# Patient Record
Sex: MALE | Race: BLACK OR AFRICAN AMERICAN | NOT HISPANIC OR LATINO | URBAN - METROPOLITAN AREA
[De-identification: names, ages, dates, MRNs, and addresses within clinical notes are randomized per-mention and may not be internally consistent; named-entity substitution may affect disease eponyms.]

---

## 2017-09-14 ENCOUNTER — LAB VISIT (OUTPATIENT)
Dept: LAB | Facility: HOSPITAL | Age: 32
End: 2017-09-14
Attending: INTERNAL MEDICINE
Payer: COMMERCIAL

## 2017-09-14 ENCOUNTER — TELEPHONE (OUTPATIENT)
Dept: ENDOCRINOLOGY | Facility: CLINIC | Age: 32
End: 2017-09-14

## 2017-09-14 ENCOUNTER — OFFICE VISIT (OUTPATIENT)
Dept: ENDOCRINOLOGY | Facility: CLINIC | Age: 32
End: 2017-09-14
Payer: COMMERCIAL

## 2017-09-14 VITALS
WEIGHT: 185.63 LBS | SYSTOLIC BLOOD PRESSURE: 136 MMHG | HEIGHT: 69 IN | TEMPERATURE: 98 F | HEART RATE: 82 BPM | DIASTOLIC BLOOD PRESSURE: 88 MMHG | BODY MASS INDEX: 27.49 KG/M2

## 2017-09-14 DIAGNOSIS — E05.90 HYPERTHYROIDISM: Primary | ICD-10-CM

## 2017-09-14 DIAGNOSIS — E05.90 HYPERTHYROIDISM: ICD-10-CM

## 2017-09-14 LAB
ALBUMIN SERPL BCP-MCNC: 4.1 G/DL
ALP SERPL-CCNC: 62 U/L
ALT SERPL W/O P-5'-P-CCNC: 30 U/L
ANION GAP SERPL CALC-SCNC: 9 MMOL/L
AST SERPL-CCNC: 34 U/L
BASOPHILS # BLD AUTO: 0.02 K/UL
BASOPHILS NFR BLD: 0.2 %
BILIRUB SERPL-MCNC: 1 MG/DL
BUN SERPL-MCNC: 8 MG/DL
CALCIUM SERPL-MCNC: 9.2 MG/DL
CHLORIDE SERPL-SCNC: 104 MMOL/L
CO2 SERPL-SCNC: 29 MMOL/L
CREAT SERPL-MCNC: 0.9 MG/DL
DIFFERENTIAL METHOD: ABNORMAL
EOSINOPHIL # BLD AUTO: 0.1 K/UL
EOSINOPHIL NFR BLD: 0.6 %
ERYTHROCYTE [DISTWIDTH] IN BLOOD BY AUTOMATED COUNT: 14.3 %
EST. GFR  (AFRICAN AMERICAN): >60 ML/MIN/1.73 M^2
EST. GFR  (NON AFRICAN AMERICAN): >60 ML/MIN/1.73 M^2
GLUCOSE SERPL-MCNC: 81 MG/DL
HCT VFR BLD AUTO: 40.3 %
HGB BLD-MCNC: 13.3 G/DL
LYMPHOCYTES # BLD AUTO: 2 K/UL
LYMPHOCYTES NFR BLD: 19.3 %
MCH RBC QN AUTO: 29.4 PG
MCHC RBC AUTO-ENTMCNC: 33 G/DL
MCV RBC AUTO: 89 FL
MONOCYTES # BLD AUTO: 1.1 K/UL
MONOCYTES NFR BLD: 10.3 %
NEUTROPHILS # BLD AUTO: 7.3 K/UL
NEUTROPHILS NFR BLD: 69.3 %
PLATELET # BLD AUTO: 284 K/UL
PMV BLD AUTO: 10.9 FL
POTASSIUM SERPL-SCNC: 3.8 MMOL/L
PROT SERPL-MCNC: 8.1 G/DL
RBC # BLD AUTO: 4.52 M/UL
SODIUM SERPL-SCNC: 142 MMOL/L
T3FREE SERPL-MCNC: 2.4 PG/ML
T4 FREE SERPL-MCNC: 0.93 NG/DL
TSH SERPL DL<=0.005 MIU/L-ACNC: 0.51 UIU/ML
WBC # BLD AUTO: 10.55 K/UL

## 2017-09-14 PROCEDURE — 99999 PR PBB SHADOW E&M-NEW PATIENT-LVL III: CPT | Mod: PBBFAC,,, | Performed by: INTERNAL MEDICINE

## 2017-09-14 PROCEDURE — 3008F BODY MASS INDEX DOCD: CPT | Mod: S$GLB,,, | Performed by: INTERNAL MEDICINE

## 2017-09-14 PROCEDURE — 84481 FREE ASSAY (FT-3): CPT

## 2017-09-14 PROCEDURE — 84443 ASSAY THYROID STIM HORMONE: CPT

## 2017-09-14 PROCEDURE — 86376 MICROSOMAL ANTIBODY EACH: CPT

## 2017-09-14 PROCEDURE — 84439 ASSAY OF FREE THYROXINE: CPT

## 2017-09-14 PROCEDURE — 84445 ASSAY OF TSI GLOBULIN: CPT

## 2017-09-14 PROCEDURE — 86800 THYROGLOBULIN ANTIBODY: CPT

## 2017-09-14 PROCEDURE — 85025 COMPLETE CBC W/AUTO DIFF WBC: CPT

## 2017-09-14 PROCEDURE — 80053 COMPREHEN METABOLIC PANEL: CPT

## 2017-09-14 PROCEDURE — 36415 COLL VENOUS BLD VENIPUNCTURE: CPT | Mod: PO

## 2017-09-14 PROCEDURE — 99204 OFFICE O/P NEW MOD 45 MIN: CPT | Mod: S$GLB,,, | Performed by: INTERNAL MEDICINE

## 2017-09-14 RX ORDER — AMLODIPINE BESYLATE 5 MG/1
5 TABLET ORAL DAILY
COMMUNITY
End: 2019-04-01

## 2017-09-14 RX ORDER — PNV NO.95/FERROUS FUM/FOLIC AC 28MG-0.8MG
100 TABLET ORAL DAILY
COMMUNITY

## 2017-09-14 RX ORDER — SERTRALINE HYDROCHLORIDE 25 MG/1
50 TABLET, FILM COATED ORAL DAILY
COMMUNITY
End: 2018-09-17 | Stop reason: SDUPTHER

## 2017-09-14 RX ORDER — HYDROCHLOROTHIAZIDE 12.5 MG/1
12.5 TABLET ORAL DAILY
COMMUNITY
End: 2019-04-01

## 2017-09-14 RX ORDER — METHIMAZOLE 10 MG/1
10 TABLET ORAL DAILY
COMMUNITY
End: 2018-09-20 | Stop reason: SDUPTHER

## 2017-09-14 NOTE — PROGRESS NOTES
""This note will be shared with the patient"Subjective:       Patient ID: Jermaine Boast is a 32 y.o. male.  Patient is new to me  Chief Complaint: Hyperthyroidism    HPI    Consultation was requested by Markerral Self       Diagnosed: 2014    Previous radiology tests:  Thyroid ultrasound : Yes - has several nodules- benign biopsy Dr Rigo Henriquez   NM uptake and scan: No    Previous thyroid surgery: No      Thyroid symptoms:fatigue, palpitations, sweating and weight loss      Thyroid medications: methimazole 10 mg once a day      Takes medication appropriately: Yes    I have reviewed the past medical, family and social history  Review of Systems   Constitutional: Positive for fatigue and unexpected weight change. Negative for appetite change, diaphoresis and fever.        Weight loss and fluctuates   HENT: Negative for sore throat and trouble swallowing.    Eyes: Negative for visual disturbance.   Respiratory: Negative for shortness of breath and wheezing.    Cardiovascular: Positive for palpitations. Negative for chest pain and leg swelling.   Gastrointestinal: Positive for diarrhea and nausea. Negative for abdominal pain and vomiting.   Endocrine: Negative for cold intolerance, heat intolerance, polydipsia, polyphagia and polyuria.   Genitourinary: Negative for difficulty urinating and dysuria.   Musculoskeletal: Negative for arthralgias and joint swelling.   Skin: Negative for color change and rash.   Neurological: Negative for dizziness, tremors, numbness and headaches.   Hematological: Negative for adenopathy.   Psychiatric/Behavioral: Positive for sleep disturbance. Negative for confusion and dysphoric mood. The patient is not nervous/anxious.        Objective:      Physical Exam   Constitutional: He is oriented to person, place, and time. He appears well-developed and well-nourished. No distress.   HENT:   Head: Normocephalic and atraumatic.   Right Ear: External ear normal.   Left Ear: External ear normal. "   Mouth/Throat: Oropharynx is clear and moist. No oropharyngeal exudate.   Eyes: Conjunctivae and EOM are normal. Pupils are equal, round, and reactive to light. No scleral icterus.   Neck: No tracheal deviation present. Thyromegaly present.   Cardiovascular: Normal rate, regular rhythm, normal heart sounds and intact distal pulses.  Exam reveals no gallop and no friction rub.    No murmur heard.  Pulmonary/Chest: Effort normal and breath sounds normal. No respiratory distress. He has no wheezes. He has no rales.   Abdominal: Soft. Bowel sounds are normal. He exhibits no distension and no mass. There is no tenderness. There is no rebound and no guarding. No hernia.   Musculoskeletal: He exhibits no edema or deformity.   Lymphadenopathy:     He has no cervical adenopathy.   Neurological: He is alert and oriented to person, place, and time. He has normal reflexes. No cranial nerve deficit.   Skin: Skin is warm and dry. No rash noted. He is not diaphoretic. No erythema.   Psychiatric: He has a normal mood and affect. His behavior is normal.   Vitals reviewed.        Lab Review:   No results found for: TSH  No results found for: FREET4  No components found for: FREET3      Assessment:     1. Hyperthyroidism  TSH    T4, free    T3, free    Thyroid peroxidase antibody    Thyroid stimulating immunoglobulin    Anti-thyroglobulin antibody    Comprehensive metabolic panel    CBC auto differential    check labs and adjust dos of methimazole as needed    Obtain records from previous endocrinologist  Plan:   Hyperthyroidism  -     TSH; Future; Expected date: 09/14/2017  -     T4, free; Future; Expected date: 09/14/2017  -     T3, free; Future; Expected date: 09/14/2017  -     Thyroid peroxidase antibody; Future; Expected date: 09/14/2017  -     Thyroid stimulating immunoglobulin; Future; Expected date: 09/14/2017  -     Anti-thyroglobulin antibody; Future; Expected date: 09/14/2017  -     Comprehensive metabolic panel; Future;  Expected date: 09/14/2017  -     CBC auto differential; Future; Expected date: 09/14/2017        Return in about 3 months (around 12/14/2017).     Thank you to Aaareferral Self for this consultation

## 2017-09-14 NOTE — TELEPHONE ENCOUNTER
----- Message from Damaris Raygzoa MD sent at 9/14/2017  1:08 PM CDT -----  Please obtain all records from his previous endocrinologist Dr Rigo Mukherjee

## 2017-09-15 LAB
THYROGLOB AB SERPL IA-ACNC: <4 IU/ML
THYROPEROXIDASE IGG SERPL-ACNC: <6 IU/ML

## 2017-09-18 LAB — TSI SER-ACNC: <0.1 IU/L

## 2017-09-20 ENCOUNTER — TELEPHONE (OUTPATIENT)
Dept: ENDOCRINOLOGY | Facility: CLINIC | Age: 32
End: 2017-09-20

## 2017-09-20 NOTE — TELEPHONE ENCOUNTER
----- Message from Verona Christie sent at 9/20/2017 12:06 PM CDT -----  Contact: pt  States she's calling to see if her paper work has been filled out and faxed. Please call pt after 3:30 at [phone]. Thank you

## 2017-09-20 NOTE — TELEPHONE ENCOUNTER
Pt stated that forms were given to MD during clinic visit and was checking to see if forms were ready for pick-up. Please advise.

## 2017-09-21 ENCOUNTER — TELEPHONE (OUTPATIENT)
Dept: ENDOCRINOLOGY | Facility: CLINIC | Age: 32
End: 2017-09-21

## 2017-09-21 NOTE — TELEPHONE ENCOUNTER
Pt advised the following, per MD orders:forms are ready for pickup and make a copy to scan in records and can inform patient has labs were normal so stay on same dose of methimazole.  Pt verbalized understanding.

## 2017-09-26 ENCOUNTER — TELEPHONE (OUTPATIENT)
Dept: ENDOCRINOLOGY | Facility: CLINIC | Age: 32
End: 2017-09-26

## 2017-09-26 NOTE — TELEPHONE ENCOUNTER
----- Message from Shikha Masters sent at 9/26/2017  9:22 AM CDT -----  Contact: Diane/[place] wife   Call caller regarding questions about side affect of med.  ..[phone]

## 2017-09-26 NOTE — TELEPHONE ENCOUNTER
"Pt's wife was requesting a CT scan, due to patient "getting strange body movements". Pt's wife was advised to notify the PCP. Pt's wife verbalized orders.  "

## 2017-09-27 ENCOUNTER — TELEPHONE (OUTPATIENT)
Dept: ENDOCRINOLOGY | Facility: CLINIC | Age: 32
End: 2017-09-27

## 2017-09-27 NOTE — TELEPHONE ENCOUNTER
----- Message from Maranda Owen sent at 9/27/2017 11:38 AM CDT -----  Contact: Diane (pt's wife)   Diane called and stated she needed to speak to the nurse. She stated that she needs to discuss the [facility] papers for the pt. She stated that the patient needs an updated copy of the papers faxed to his employer at [phone]. She can be reached at [phone].    Thanks,  TF

## 2017-10-09 ENCOUNTER — TELEPHONE (OUTPATIENT)
Dept: ENDOCRINOLOGY | Facility: CLINIC | Age: 32
End: 2017-10-09

## 2017-10-09 NOTE — TELEPHONE ENCOUNTER
----- Message from Luci Melgar sent at 10/6/2017 10:46 AM CDT -----  Contact: Patient  Patient is checking on status of the medical form for his job, please call him back at [phone]. Thank you

## 2017-10-11 ENCOUNTER — TELEPHONE (OUTPATIENT)
Dept: ENDOCRINOLOGY | Facility: CLINIC | Age: 32
End: 2017-10-11

## 2017-10-12 ENCOUNTER — TELEPHONE (OUTPATIENT)
Dept: ENDOCRINOLOGY | Facility: CLINIC | Age: 32
End: 2017-10-12

## 2017-10-12 NOTE — TELEPHONE ENCOUNTER
Per pt's wife, fax work forms to Attn: Consuelo [phone], upon completion.    **Please attach this information to forms.

## 2017-10-12 NOTE — TELEPHONE ENCOUNTER
----- Message from Chary Collins sent at 10/12/2017  8:36 AM CDT -----  Contact: [phone]  Would like to consult with nurse about paperwork to be sent to pt job. Form stating pt has thyroid issues.  Please call wife with any questions at [phone]. Fax to sandro Cordero [phone]. thx lj

## 2017-10-16 ENCOUNTER — TELEPHONE (OUTPATIENT)
Dept: ENDOCRINOLOGY | Facility: CLINIC | Age: 32
End: 2017-10-16

## 2017-10-16 NOTE — TELEPHONE ENCOUNTER
----- Message from Chary Collins sent at 10/16/2017 10:55 AM CDT -----  Contact: [phone]  Would like to consult with nurse about paperwork that was filled out for pt. has questions; concerns;  Please call bk at [phone]. thx lj

## 2017-10-20 ENCOUNTER — HISTORICAL (OUTPATIENT)
Dept: ADMINISTRATIVE | Facility: HOSPITAL | Age: 32
End: 2017-10-20

## 2017-10-22 LAB — FINAL CULTURE: NORMAL

## 2017-10-25 ENCOUNTER — TELEPHONE (OUTPATIENT)
Dept: ENDOCRINOLOGY | Facility: CLINIC | Age: 32
End: 2017-10-25

## 2017-10-25 NOTE — TELEPHONE ENCOUNTER
----- Message from Chula Bowman sent at 10/25/2017  9:29 AM CDT -----  Contact: Vlad Contreras [phone]  Would like to consult with nurse regarding paperwork. Please call back [phone].  Md Alejandra

## 2017-10-25 NOTE — TELEPHONE ENCOUNTER
----- Message from Kamla Ann sent at 10/25/2017 12:25 PM CDT -----  Contact: Pt's wife,   Pt's wife stated that she would like to speak to the nurse regarding her 's forms. She also has a general question as well. She can be reached at [phone].

## 2017-11-02 ENCOUNTER — TELEPHONE (OUTPATIENT)
Dept: ENDOCRINOLOGY | Facility: CLINIC | Age: 32
End: 2017-11-02

## 2017-11-02 NOTE — TELEPHONE ENCOUNTER
----- Message from Cindy Elizabeth sent at 10/31/2017  9:19 AM CDT -----  Contact: [place]/ Wife   Caller states the paperwork will be faxed over, request a call back before you fill the form out. .[phone] (home)

## 2018-03-26 ENCOUNTER — LAB VISIT (OUTPATIENT)
Dept: LAB | Facility: HOSPITAL | Age: 33
End: 2018-03-26
Attending: INTERNAL MEDICINE
Payer: COMMERCIAL

## 2018-03-26 ENCOUNTER — OFFICE VISIT (OUTPATIENT)
Dept: ENDOCRINOLOGY | Facility: CLINIC | Age: 33
End: 2018-03-26
Payer: COMMERCIAL

## 2018-03-26 VITALS
TEMPERATURE: 99 F | HEIGHT: 69 IN | HEART RATE: 83 BPM | DIASTOLIC BLOOD PRESSURE: 88 MMHG | BODY MASS INDEX: 31.21 KG/M2 | WEIGHT: 210.75 LBS | SYSTOLIC BLOOD PRESSURE: 132 MMHG

## 2018-03-26 DIAGNOSIS — E05.90 HYPERTHYROIDISM: ICD-10-CM

## 2018-03-26 DIAGNOSIS — E05.90 HYPERTHYROIDISM: Primary | ICD-10-CM

## 2018-03-26 PROCEDURE — 85025 COMPLETE CBC W/AUTO DIFF WBC: CPT

## 2018-03-26 PROCEDURE — 99999 PR PBB SHADOW E&M-EST. PATIENT-LVL III: CPT | Mod: PBBFAC,,, | Performed by: INTERNAL MEDICINE

## 2018-03-26 PROCEDURE — 84481 FREE ASSAY (FT-3): CPT

## 2018-03-26 PROCEDURE — 84439 ASSAY OF FREE THYROXINE: CPT

## 2018-03-26 PROCEDURE — 84443 ASSAY THYROID STIM HORMONE: CPT

## 2018-03-26 PROCEDURE — 36415 COLL VENOUS BLD VENIPUNCTURE: CPT | Mod: PO

## 2018-03-26 PROCEDURE — 99213 OFFICE O/P EST LOW 20 MIN: CPT | Mod: S$GLB,,, | Performed by: INTERNAL MEDICINE

## 2018-03-26 PROCEDURE — 80076 HEPATIC FUNCTION PANEL: CPT

## 2018-03-26 NOTE — PROGRESS NOTES
Patient ID: Jermaine Boast is a 33 y.o. male.  Patient is here for follow up      Last time seen in September and he is here with his wife  Chief Complaint: Hyperthyroidism      HPI    Consultation was requested by Maurice Self       Diagnosed: 2014    Previous radiology tests:  Thyroid ultrasound : Yes - has several nodules- benign biopsy Dr Rigo Henriquez   NM uptake and scan: No    Previous thyroid surgery: No      Thyroid symptoms:fatigue, palpitations,  Feel cold and hot, trouble concentrating and weight gain and wife said he's been very sleepy and hard to wake up    Thyroid medications: methimazole 10 mg once a day      Takes medication appropriately: Yes      I have reviewed the past medical, family and social history    Review of Systems   Constitutional: Positive for fatigue and unexpected weight change. Negative for appetite change, diaphoresis and fever.   HENT: Negative for sore throat and trouble swallowing.    Eyes: Negative for visual disturbance.   Respiratory: Negative for shortness of breath and wheezing.    Cardiovascular: Positive for palpitations. Negative for chest pain and leg swelling.   Gastrointestinal: Positive for constipation. Negative for abdominal pain, diarrhea, nausea and vomiting.   Endocrine: Negative for cold intolerance, heat intolerance, polydipsia, polyphagia and polyuria.   Genitourinary: Negative for difficulty urinating and dysuria.   Musculoskeletal: Negative for arthralgias and joint swelling.   Skin: Negative for color change and rash.   Neurological: Negative for dizziness, tremors, numbness and headaches.   Hematological: Negative for adenopathy.   Psychiatric/Behavioral: Negative for confusion, dysphoric mood and sleep disturbance. The patient is not nervous/anxious.        Objective:      Physical Exam   Constitutional: He appears well-developed and well-nourished. No distress.   HENT:   Head: Normocephalic and atraumatic.   Eyes: Conjunctivae are normal.   Neck:  No JVD present. No tracheal deviation present. No thyromegaly present.   Cardiovascular: Normal rate, regular rhythm, normal heart sounds and intact distal pulses.  Exam reveals no gallop and no friction rub.    No murmur heard.  Pulmonary/Chest: Effort normal and breath sounds normal. No stridor. No respiratory distress. He has no wheezes. He has no rales. He exhibits no tenderness.   Musculoskeletal: He exhibits no edema or deformity.   Lymphadenopathy:     He has no cervical adenopathy.   Neurological: He is alert.   Skin: He is not diaphoretic.   Vitals reviewed.        Lab Review:   No visits with results within 6 Month(s) from this visit.   Latest known visit with results is:   Lab Visit on 09/14/2017   Component Date Value    TSH 09/14/2017 0.507     Free T4 09/14/2017 0.93     T3, Free 09/14/2017 2.4     Thyroperoxidase Antibodi* 09/14/2017 <6.0     Thyroid-Stim IG [facility]* 09/14/2017 <0.10     Thyroglobulin Ab Screen 09/14/2017 <4.0     Sodium 09/14/2017 142     Potassium 09/14/2017 3.8     Chloride 09/14/2017 104     CO2 09/14/2017 29     Glucose 09/14/2017 81     BUN, Bld 09/14/2017 8     Creatinine 09/14/2017 0.9     Calcium 09/14/2017 9.2     Total Protein 09/14/2017 8.1     Albumin 09/14/2017 4.1     Total Bilirubin 09/14/2017 1.0     Alkaline Phosphatase 09/14/2017 62     AST 09/14/2017 34     ALT 09/14/2017 30     Anion Gap 09/14/2017 9     eGFR if African American 09/14/2017 >60.0     eGFR if non African Amer* 09/14/2017 >60.0     WBC 09/14/2017 10.55     RBC 09/14/2017 4.52*    Hemoglobin 09/14/2017 13.3*    Hematocrit 09/14/2017 40.3     MCV 09/14/2017 89     MCH 09/14/2017 29.4     MCHC 09/14/2017 33.0     RDW 09/14/2017 14.3     Platelets 09/14/2017 284     MPV 09/14/2017 10.9     Gran # (ANC) 09/14/2017 7.3     Lymph # 09/14/2017 2.0     Mono # 09/14/2017 1.1*    Eos # 09/14/2017 0.1     Baso # 09/14/2017 0.02     Gran% 09/14/2017 69.3     Lymph%  09/14/2017 19.3     Mono% 09/14/2017 10.3     Eosinophil% 09/14/2017 0.6     Basophil% 09/14/2017 0.2     Differential Method 09/14/2017 Automated        Assessment:     1. Hyperthyroidism  TSH    T4, free    T3, free    CBC auto differential    Hepatic function panel    he may have medication-induced hypothyroidism but will check labs below for further evaluation  Plan:   Hyperthyroidism  -     TSH; Future; Expected date: 03/26/2018  -     T4, free; Future; Expected date: 03/26/2018  -     T3, free; Future; Expected date: 03/26/2018  -     CBC auto differential; Future; Expected date: 03/26/2018  -     Hepatic function panel; Future; Expected date: 03/26/2018          Follow-up in about 3 months (around 6/26/2018).    Labs prior to appointment? not applicable     Disclaimer:  This note may have been partially prepared using voice recognition software and  it may have not been extensively proofed, as such there could be errors within the text such as sound alike errors.

## 2018-03-27 LAB
ALBUMIN SERPL BCP-MCNC: 4.5 G/DL
ALP SERPL-CCNC: 65 U/L
ALT SERPL W/O P-5'-P-CCNC: 27 U/L
AST SERPL-CCNC: 32 U/L
BASOPHILS # BLD AUTO: 0.02 K/UL
BASOPHILS NFR BLD: 0.2 %
BILIRUB DIRECT SERPL-MCNC: 0.3 MG/DL
BILIRUB SERPL-MCNC: 0.8 MG/DL
DIFFERENTIAL METHOD: ABNORMAL
EOSINOPHIL # BLD AUTO: 0.1 K/UL
EOSINOPHIL NFR BLD: 0.9 %
ERYTHROCYTE [DISTWIDTH] IN BLOOD BY AUTOMATED COUNT: 12.6 %
HCT VFR BLD AUTO: 42 %
HGB BLD-MCNC: 13.1 G/DL
IMM GRANULOCYTES # BLD AUTO: 0.04 K/UL
IMM GRANULOCYTES NFR BLD AUTO: 0.4 %
LYMPHOCYTES # BLD AUTO: 2.5 K/UL
LYMPHOCYTES NFR BLD: 27.4 %
MCH RBC QN AUTO: 29.3 PG
MCHC RBC AUTO-ENTMCNC: 31.2 G/DL
MCV RBC AUTO: 94 FL
MONOCYTES # BLD AUTO: 0.9 K/UL
MONOCYTES NFR BLD: 10.3 %
NEUTROPHILS # BLD AUTO: 5.5 K/UL
NEUTROPHILS NFR BLD: 60.8 %
NRBC BLD-RTO: 0 /100 WBC
PLATELET # BLD AUTO: 272 K/UL
PMV BLD AUTO: 11.6 FL
PROT SERPL-MCNC: 8.2 G/DL
RBC # BLD AUTO: 4.47 M/UL
T3FREE SERPL-MCNC: 2.5 PG/ML
T4 FREE SERPL-MCNC: 1.11 NG/DL
TSH SERPL DL<=0.005 MIU/L-ACNC: 0.08 UIU/ML
WBC # BLD AUTO: 9.06 K/UL

## 2018-04-05 ENCOUNTER — TELEPHONE (OUTPATIENT)
Dept: ENDOCRINOLOGY | Facility: CLINIC | Age: 33
End: 2018-04-05

## 2018-04-05 NOTE — TELEPHONE ENCOUNTER
Voicemail has not been set-up. The following information was mailed to pt: labs are only mildly abnormal.  I wanted to stay on the 10 mg dose for now and try not to miss any doses.  Take 1 tablet daily

## 2018-07-03 ENCOUNTER — TELEPHONE (OUTPATIENT)
Dept: ENDOCRINOLOGY | Facility: CLINIC | Age: 33
End: 2018-07-03

## 2018-07-03 NOTE — TELEPHONE ENCOUNTER
----- Message from Sear Sanchez sent at 7/3/2018  2:13 PM CDT -----  Contact: pt  Pt calling for a referral to see another doctor, she can be reached at  [phone] Thanks

## 2018-09-17 ENCOUNTER — LAB VISIT (OUTPATIENT)
Dept: LAB | Facility: HOSPITAL | Age: 33
End: 2018-09-17
Attending: INTERNAL MEDICINE
Payer: COMMERCIAL

## 2018-09-17 ENCOUNTER — OFFICE VISIT (OUTPATIENT)
Dept: ENDOCRINOLOGY | Facility: CLINIC | Age: 33
End: 2018-09-17
Payer: COMMERCIAL

## 2018-09-17 VITALS
SYSTOLIC BLOOD PRESSURE: 126 MMHG | HEART RATE: 80 BPM | TEMPERATURE: 99 F | WEIGHT: 217.38 LBS | DIASTOLIC BLOOD PRESSURE: 88 MMHG | BODY MASS INDEX: 32.2 KG/M2 | HEIGHT: 69 IN

## 2018-09-17 DIAGNOSIS — R53.81 MALAISE AND FATIGUE: ICD-10-CM

## 2018-09-17 DIAGNOSIS — R53.83 MALAISE AND FATIGUE: ICD-10-CM

## 2018-09-17 DIAGNOSIS — R68.89 HEAT INTOLERANCE: ICD-10-CM

## 2018-09-17 DIAGNOSIS — E04.2 MULTIPLE THYROID NODULES: ICD-10-CM

## 2018-09-17 DIAGNOSIS — E05.90 HYPERTHYROIDISM: Primary | ICD-10-CM

## 2018-09-17 DIAGNOSIS — R11.2 NAUSEA AND VOMITING, INTRACTABILITY OF VOMITING NOT SPECIFIED, UNSPECIFIED VOMITING TYPE: ICD-10-CM

## 2018-09-17 DIAGNOSIS — E05.90 HYPERTHYROIDISM: ICD-10-CM

## 2018-09-17 PROCEDURE — 84439 ASSAY OF FREE THYROXINE: CPT

## 2018-09-17 PROCEDURE — 84443 ASSAY THYROID STIM HORMONE: CPT

## 2018-09-17 PROCEDURE — 84481 FREE ASSAY (FT-3): CPT

## 2018-09-17 PROCEDURE — 84445 ASSAY OF TSI GLOBULIN: CPT

## 2018-09-17 PROCEDURE — 85025 COMPLETE CBC W/AUTO DIFF WBC: CPT

## 2018-09-17 PROCEDURE — 80053 COMPREHEN METABOLIC PANEL: CPT

## 2018-09-17 PROCEDURE — 99999 PR PBB SHADOW E&M-EST. PATIENT-LVL III: CPT | Mod: PBBFAC,,, | Performed by: INTERNAL MEDICINE

## 2018-09-17 PROCEDURE — 99214 OFFICE O/P EST MOD 30 MIN: CPT | Mod: S$GLB,,, | Performed by: INTERNAL MEDICINE

## 2018-09-17 PROCEDURE — 3008F BODY MASS INDEX DOCD: CPT | Mod: CPTII,S$GLB,, | Performed by: INTERNAL MEDICINE

## 2018-09-17 RX ORDER — SERTRALINE HYDROCHLORIDE 50 MG/1
50 TABLET, FILM COATED ORAL DAILY
COMMUNITY

## 2018-09-17 NOTE — PROGRESS NOTES
Patient ID: Jermaine Boast is a 33 y.o. male.  Patient is here for follow up        Chief Complaint: Hyperthyroidism      HPI    Consultation was requested by Aaareferral Self       Diagnosed: 2014    Previous radiology tests:  Thyroid ultrasound : Yes - has several nodules- benign biopsy Dr Rigo Henriquez   NM uptake and scan: No    Previous thyroid surgery: No      Thyroid symptoms:  Patient reports multiple varied complaints-see nurse's note but complains of cough and congestion for 3 months, visual complaints requiring glasses- vision corrected with glasses, trouble swallowing, hot flashes, feeling overheated- house has to be hot, sweats at night, + nausea and vomiting in morning and he denies any abdominal pain    New PCP is Dr Fallon and has an appointment in a few weeks  His weight is higher than last visit    Thyroid medications: methimazole 10 mg once a day however he has been out of medication for at least 2 weeks and his wife had an old bottle from his previous endocrinologist    Takes medication appropriately: Yes        I have reviewed the past medical, family and social history    Review of Systems   Constitutional: Positive for appetite change and fatigue. Negative for diaphoresis, fever and unexpected weight change.   HENT: Positive for trouble swallowing. Negative for sore throat.    Eyes: Negative for visual disturbance.   Respiratory: Positive for cough. Negative for shortness of breath and wheezing.    Cardiovascular: Negative for chest pain, palpitations and leg swelling.   Gastrointestinal: Positive for nausea and vomiting. Negative for abdominal pain and diarrhea.   Endocrine: Negative for cold intolerance, heat intolerance, polydipsia, polyphagia and polyuria.   Genitourinary: Negative for difficulty urinating and dysuria.   Musculoskeletal: Negative for arthralgias and joint swelling.   Skin: Negative for color change and rash.   Neurological: Negative for dizziness, tremors, numbness and  headaches.   Hematological: Negative for adenopathy.   Psychiatric/Behavioral: Negative for confusion, dysphoric mood and sleep disturbance. The patient is not nervous/anxious.        Objective:      Physical Exam   Constitutional: He is oriented to person, place, and time. He appears well-developed and well-nourished. No distress.   HENT:   Head: Normocephalic and atraumatic.   Right Ear: External ear normal.   Left Ear: External ear normal.   Mouth/Throat: Oropharynx is clear and moist. No oropharyngeal exudate.   Eyes: Conjunctivae and EOM are normal. Pupils are equal, round, and reactive to light. No scleral icterus.   Neck: No tracheal deviation present. No thyromegaly present.   Cardiovascular: Normal rate, regular rhythm, normal heart sounds and intact distal pulses. Exam reveals no gallop and no friction rub.   No murmur heard.  Pulmonary/Chest: Effort normal and breath sounds normal. No respiratory distress. He has no wheezes. He has no rales.   Abdominal: Soft. Bowel sounds are normal. He exhibits no distension and no mass. There is no tenderness. There is no rebound and no guarding. No hernia.   Musculoskeletal: He exhibits no edema or deformity.   Lymphadenopathy:     He has no cervical adenopathy.   Neurological: He is alert and oriented to person, place, and time. He has normal reflexes. No cranial nerve deficit.   Skin: Skin is warm and dry. No rash noted. He is not diaphoretic. No erythema.   Psychiatric: He has a normal mood and affect. His behavior is normal.   Vitals reviewed.        Lab Review:   Lab Visit on 03/26/2018   Component Date Value    TSH 03/26/2018 0.081*    Free T4 03/26/2018 1.11     T3, Free 03/26/2018 2.5     WBC 03/26/2018 9.06     RBC 03/26/2018 4.47*    Hemoglobin 03/26/2018 13.1*    Hematocrit 03/26/2018 42.0     MCV 03/26/2018 94     MCH 03/26/2018 29.3     MCHC 03/26/2018 31.2*    RDW 03/26/2018 12.6     Platelets 03/26/2018 272     MPV 03/26/2018 11.6      Immature Granulocytes 03/26/2018 0.4     Gran # (ANC) 03/26/2018 5.5     Immature Grans (Abs) 03/26/2018 0.04     Lymph # 03/26/2018 2.5     Mono # 03/26/2018 0.9     Eos # 03/26/2018 0.1     Baso # 03/26/2018 0.02     nRBC 03/26/2018 0     Gran% 03/26/2018 60.8     Lymph% 03/26/2018 27.4     Mono% 03/26/2018 10.3     Eosinophil% 03/26/2018 0.9     Basophil% 03/26/2018 0.2     Differential Method 03/26/2018 Automated     Total Protein 03/26/2018 8.2     Albumin 03/26/2018 4.5     Total Bilirubin 03/26/2018 0.8     Bilirubin, Direct 03/26/2018 0.3     AST 03/26/2018 32     ALT 03/26/2018 27     Alkaline Phosphatase 03/26/2018 65        Assessment:     1. Hyperthyroidism  TSH    T3, free    T4, free    CBC auto differential    Comprehensive metabolic panel    Thyroid stimulating immunoglobulin   2. Malaise and fatigue  TSH    T3, free    T4, free    CBC auto differential    Comprehensive metabolic panel    Thyroid stimulating immunoglobulin   3. Multiple thyroid nodules  TSH    T3, free    T4, free    CBC auto differential    Comprehensive metabolic panel    US Soft Tissue Head Neck Thyroid    Thyroid stimulating immunoglobulin   4. Nausea and vomiting, intractability of vomiting not specified, unspecified vomiting type     5. Heat intolerance      I will check labs below to evaluate some of his symptoms.  Recheck his thyroid function tests and if still hyperthyroid I will refill his methimazole and also schedule thyroid ultrasound.  Encourage patient to keep his follow-up PCP for ongoing follow-up of his multiple symptoms including his persistent cough and chest congestion    We discussed clear liquids and avoiding spicy food and greasy food and fast food while he is having nausea and vomiting  Plan:   Hyperthyroidism  -     TSH; Future; Expected date: 09/17/2018  -     T3, free; Future; Expected date: 09/17/2018  -     T4, free; Future; Expected date: 09/17/2018  -     CBC auto differential;  Future; Expected date: 09/17/2018  -     Comprehensive metabolic panel; Future; Expected date: 09/17/2018  -     Thyroid stimulating immunoglobulin; Future; Expected date: 09/17/2018    Malaise and fatigue  -     TSH; Future; Expected date: 09/17/2018  -     T3, free; Future; Expected date: 09/17/2018  -     T4, free; Future; Expected date: 09/17/2018  -     CBC auto differential; Future; Expected date: 09/17/2018  -     Comprehensive metabolic panel; Future; Expected date: 09/17/2018  -     Thyroid stimulating immunoglobulin; Future; Expected date: 09/17/2018    Multiple thyroid nodules  -     TSH; Future; Expected date: 09/17/2018  -     T3, free; Future; Expected date: 09/17/2018  -     T4, free; Future; Expected date: 09/17/2018  -     CBC auto differential; Future; Expected date: 09/17/2018  -     Comprehensive metabolic panel; Future; Expected date: 09/17/2018  -     US Soft Tissue Head Neck Thyroid; Future; Expected date: 09/17/2018  -     Thyroid stimulating immunoglobulin; Future; Expected date: 09/17/2018    Nausea and vomiting, intractability of vomiting not specified, unspecified vomiting type    Heat intolerance      Follow-up in 4 months    No Follow-up on file.    Labs prior to appointment? not applicable     Disclaimer:  This note may have been partially prepared using voice recognition software and  it may have not been extensively proofed, as such there could be errors within the text such as sound alike errors.

## 2018-09-17 NOTE — PROGRESS NOTES
Hot flashes 3-4 times weekly, requiring the patient's to have cooling unit set to 60 degrees at home; finger tips tingling on bilateral hands; visual changes requiring glasses; loss of appetite; difficulty swallowing; mucus congestion in chest for 3 months yellow in color, thick, scant amount; increase shortness of breath sleep; decrease sleep (5 hours nightly); lower back pain 7 (scale of 1-10)

## 2018-09-18 LAB
ALBUMIN SERPL BCP-MCNC: 4.2 G/DL
ALP SERPL-CCNC: 80 U/L
ALT SERPL W/O P-5'-P-CCNC: 33 U/L
ANION GAP SERPL CALC-SCNC: 9 MMOL/L
AST SERPL-CCNC: 28 U/L
BASOPHILS # BLD AUTO: 0.04 K/UL
BASOPHILS NFR BLD: 0.4 %
BILIRUB SERPL-MCNC: 0.8 MG/DL
BUN SERPL-MCNC: 10 MG/DL
CALCIUM SERPL-MCNC: 9.6 MG/DL
CHLORIDE SERPL-SCNC: 105 MMOL/L
CO2 SERPL-SCNC: 26 MMOL/L
CREAT SERPL-MCNC: 0.9 MG/DL
DIFFERENTIAL METHOD: ABNORMAL
EOSINOPHIL # BLD AUTO: 0.1 K/UL
EOSINOPHIL NFR BLD: 1 %
ERYTHROCYTE [DISTWIDTH] IN BLOOD BY AUTOMATED COUNT: 13.3 %
EST. GFR  (AFRICAN AMERICAN): >60 ML/MIN/1.73 M^2
EST. GFR  (NON AFRICAN AMERICAN): >60 ML/MIN/1.73 M^2
GLUCOSE SERPL-MCNC: 72 MG/DL
HCT VFR BLD AUTO: 41.4 %
HGB BLD-MCNC: 12.4 G/DL
IMM GRANULOCYTES # BLD AUTO: 0.03 K/UL
IMM GRANULOCYTES NFR BLD AUTO: 0.3 %
LYMPHOCYTES # BLD AUTO: 2.2 K/UL
LYMPHOCYTES NFR BLD: 24.2 %
MCH RBC QN AUTO: 28.8 PG
MCHC RBC AUTO-ENTMCNC: 30 G/DL
MCV RBC AUTO: 96 FL
MONOCYTES # BLD AUTO: 1 K/UL
MONOCYTES NFR BLD: 10.7 %
NEUTROPHILS # BLD AUTO: 5.9 K/UL
NEUTROPHILS NFR BLD: 63.4 %
NRBC BLD-RTO: 0 /100 WBC
PLATELET # BLD AUTO: 276 K/UL
PMV BLD AUTO: 12 FL
POTASSIUM SERPL-SCNC: 4.1 MMOL/L
PROT SERPL-MCNC: 7.9 G/DL
RBC # BLD AUTO: 4.3 M/UL
SODIUM SERPL-SCNC: 140 MMOL/L
T3FREE SERPL-MCNC: 2.7 PG/ML
T4 FREE SERPL-MCNC: 1.1 NG/DL
TSH SERPL DL<=0.005 MIU/L-ACNC: 0.13 UIU/ML
WBC # BLD AUTO: 9.24 K/UL

## 2018-09-20 ENCOUNTER — TELEPHONE (OUTPATIENT)
Dept: ENDOCRINOLOGY | Facility: CLINIC | Age: 33
End: 2018-09-20

## 2018-09-20 LAB — TSI SER-ACNC: <0.1 IU/L

## 2018-09-20 RX ORDER — METHIMAZOLE 10 MG/1
10 TABLET ORAL DAILY
Qty: 30 TABLET | Refills: 3 | Status: SHIPPED | OUTPATIENT
Start: 2018-09-20 | End: 2019-06-15 | Stop reason: SDUPTHER

## 2018-09-20 RX ORDER — METHIMAZOLE 5 MG/1
10 TABLET ORAL DAILY
Qty: 45 TABLET | Refills: 6 | Status: CANCELLED | OUTPATIENT
Start: 2018-09-20

## 2018-09-20 NOTE — TELEPHONE ENCOUNTER
Pt advised the following, per MD orders:labs are stable except he just needs to get back on methimazole and I will send a prescription for 10 mg once a day. Pt verbalized understanding. Call ended pleasantly.

## 2018-10-01 ENCOUNTER — TELEPHONE (OUTPATIENT)
Dept: ENDOCRINOLOGY | Facility: CLINIC | Age: 33
End: 2018-10-01

## 2018-10-01 NOTE — TELEPHONE ENCOUNTER
----- Message from Urmila Perez sent at 10/1/2018  3:27 PM CDT -----  Contact: pt  Calling in [facility] to please give him a call back and RX has not been sent yet to [facility] in [place]  And please advise [phone] (home)

## 2018-10-01 NOTE — TELEPHONE ENCOUNTER
"Verified with pharmacist that Methimazole prescription had been received. Pt advised that pharmacist stated prescription was "in process". Pt verbalized understanding. Call ended pleasantly.  "

## 2019-02-15 ENCOUNTER — HOSPITAL ENCOUNTER (OUTPATIENT)
Dept: RADIOLOGY | Facility: HOSPITAL | Age: 34
Discharge: HOME | End: 2019-02-15
Attending: INTERNAL MEDICINE
Payer: COMMERCIAL

## 2019-02-15 DIAGNOSIS — E04.2 MULTIPLE THYROID NODULES: ICD-10-CM

## 2019-02-15 PROCEDURE — 76536 US EXAM OF HEAD AND NECK: CPT | Mod: TC

## 2019-02-15 PROCEDURE — 76536 US SOFT TISSUE HEAD NECK THYROID: ICD-10-PCS | Mod: 26,,, | Performed by: RADIOLOGY

## 2019-02-15 PROCEDURE — 76536 US EXAM OF HEAD AND NECK: CPT | Mod: 26,,, | Performed by: RADIOLOGY

## 2019-02-26 ENCOUNTER — TELEPHONE (OUTPATIENT)
Dept: ENDOCRINOLOGY | Facility: CLINIC | Age: 34
End: 2019-02-26

## 2019-02-26 NOTE — TELEPHONE ENCOUNTER
Called pt wife, Informed her of ultrasound results. Advised her to schedule a follow up appointment for pt to discuss condition further with MD. Wife voiced understanding and stated she will call back to schedule appointment.

## 2019-02-26 NOTE — TELEPHONE ENCOUNTER
----- Message from Damaris Raygoza MD sent at 2/26/2019 10:07 AM CST -----  Please let patient know his thyroid ultrasound shows thyroid nodules that I plan to follow over time.  He is due for follow-up appointment so please have him schedule the next available appointment

## 2019-02-26 NOTE — TELEPHONE ENCOUNTER
----- Message from Caro Quiroga sent at 2/26/2019 10:43 AM CST -----  Contact: wife   .Type:  Patient Returning Call    Who Called: wife  Who Left Message for Patient:pt  Does the patient know what this is regarding?: results  Would the patient rather a call back or a response via [employer]? Call back   Best Call Back Number: [phone]  Additional Information: \[facility] [place] [facility]\"" call wife back at  [phone]

## 2019-04-01 ENCOUNTER — OFFICE VISIT (OUTPATIENT)
Dept: ENDOCRINOLOGY | Facility: CLINIC | Age: 34
End: 2019-04-01
Payer: COMMERCIAL

## 2019-04-01 VITALS
WEIGHT: 205 LBS | DIASTOLIC BLOOD PRESSURE: 98 MMHG | HEART RATE: 93 BPM | SYSTOLIC BLOOD PRESSURE: 152 MMHG | BODY MASS INDEX: 30.36 KG/M2 | OXYGEN SATURATION: 98 % | HEIGHT: 69 IN

## 2019-04-01 DIAGNOSIS — E04.2 MULTIPLE THYROID NODULES: ICD-10-CM

## 2019-04-01 DIAGNOSIS — E05.90 HYPERTHYROIDISM: Primary | ICD-10-CM

## 2019-04-01 PROCEDURE — 99999 PR PBB SHADOW E&M-EST. PATIENT-LVL III: CPT | Mod: PBBFAC,,, | Performed by: INTERNAL MEDICINE

## 2019-04-01 PROCEDURE — 3008F PR BODY MASS INDEX (BMI) DOCUMENTED: ICD-10-PCS | Mod: CPTII,S$GLB,, | Performed by: INTERNAL MEDICINE

## 2019-04-01 PROCEDURE — 99999 PR PBB SHADOW E&M-EST. PATIENT-LVL III: ICD-10-PCS | Mod: PBBFAC,,, | Performed by: INTERNAL MEDICINE

## 2019-04-01 PROCEDURE — 99213 PR OFFICE/OUTPT VISIT, EST, LEVL III, 20-29 MIN: ICD-10-PCS | Mod: S$GLB,,, | Performed by: INTERNAL MEDICINE

## 2019-04-01 PROCEDURE — 99213 OFFICE O/P EST LOW 20 MIN: CPT | Mod: S$GLB,,, | Performed by: INTERNAL MEDICINE

## 2019-04-01 PROCEDURE — 3008F BODY MASS INDEX DOCD: CPT | Mod: CPTII,S$GLB,, | Performed by: INTERNAL MEDICINE

## 2019-04-01 RX ORDER — AMLODIPINE BESYLATE 10 MG/1
10 TABLET ORAL DAILY
Refills: 0 | COMMUNITY
Start: 2019-03-13

## 2019-04-01 RX ORDER — HYDROCHLOROTHIAZIDE 25 MG/1
25 TABLET ORAL DAILY
COMMUNITY

## 2019-04-01 NOTE — PROGRESS NOTES
Patient ID: Jermaine Boast is a 34 y.o. male.  Patient is here for follow up        Chief Complaint: Follow-up      HPI    Consultation was requested by Aaareferral Self       Diagnosed: 2014    Previous radiology tests:  Thyroid ultrasound : Yes - has several nodules- benign biopsy Dr Rigo Henriquez -recently had thyroid ultrasound here in February 2019 and showed bilateral thyroid nodules with largest measuring 1.8 cm, more prominent on the left side, many of the nodules seem isoechoic I directly viewed nodules, no calcifications are irregular borders or hypervascularity  NM uptake and scan: No    Previous thyroid surgery: No    TSI antibody had been checked multiple times and was negative  Thyroid symptoms:  None today and denies any trouble swallowing, denies palpitations or sweats  New PCP is Dr Fallon   His weight is higher than last visit    Thyroid medications: methimazole 10 mg     Patient brought in recent labs ordered by his PCP and his TSH was noted to be mildly suppressed at 0.27 with a normal free T4 and liver enzymes were normal and CBC overall was unremarkable    Takes medication appropriately: Yes        I have reviewed the past medical, family and social history    Review of Systems   Constitutional: Negative for appetite change, diaphoresis, fatigue, fever and unexpected weight change.   HENT: Negative for sore throat and trouble swallowing.    Eyes: Negative for visual disturbance.   Respiratory: Negative for shortness of breath and wheezing.    Cardiovascular: Negative for chest pain, palpitations and leg swelling.   Gastrointestinal: Negative for abdominal pain, diarrhea, nausea and vomiting.   Endocrine: Negative for cold intolerance, heat intolerance, polydipsia, polyphagia and polyuria.   Genitourinary: Negative for difficulty urinating and dysuria.   Musculoskeletal: Negative for arthralgias and joint swelling.   Skin: Negative for color change and rash.   Neurological: Negative for  dizziness, tremors, numbness and headaches.   Hematological: Negative for adenopathy.   Psychiatric/Behavioral: Negative for confusion, dysphoric mood and sleep disturbance. The patient is not nervous/anxious.        Objective:      Physical Exam   Constitutional: He appears well-developed and well-nourished. No distress.   HENT:   Head: Normocephalic and atraumatic.   Eyes: Conjunctivae are normal. No scleral icterus.   Neck: No JVD present. No tracheal deviation present. No thyromegaly present.   Cardiovascular: Normal rate, regular rhythm, normal heart sounds and intact distal pulses. Exam reveals no gallop and no friction rub.   No murmur heard.  Pulmonary/Chest: Effort normal and breath sounds normal. No stridor. No respiratory distress. He has no wheezes. He has no rales. He exhibits no tenderness.   Musculoskeletal: He exhibits no edema or deformity.   Lymphadenopathy:     He has no cervical adenopathy.   Neurological: He is alert.   Skin: He is not diaphoretic.   Vitals reviewed.        Lab Review:   No visits with results within 6 Month(s) from this visit.   Latest known visit with results is:   Lab Visit on 09/17/2018   Component Date Value    TSH 09/17/2018 0.129*    T3, Free 09/17/2018 2.7     Free T4 09/17/2018 1.10     WBC 09/17/2018 9.24     RBC 09/17/2018 4.30*    Hemoglobin 09/17/2018 12.4*    Hematocrit 09/17/2018 41.4     MCV 09/17/2018 96     MCH 09/17/2018 28.8     MCHC 09/17/2018 30.0*    RDW 09/17/2018 13.3     Platelets 09/17/2018 276     MPV 09/17/2018 12.0     Immature Granulocytes 09/17/2018 0.3     Gran # (ANC) 09/17/2018 5.9     Immature Grans (Abs) 09/17/2018 0.03     Lymph # 09/17/2018 2.2     Mono # 09/17/2018 1.0     Eos # 09/17/2018 0.1     Baso # 09/17/2018 0.04     nRBC 09/17/2018 0     Gran% 09/17/2018 63.4     Lymph% 09/17/2018 24.2     Mono% 09/17/2018 10.7     Eosinophil% 09/17/2018 1.0     Basophil% 09/17/2018 0.4     Differential Method 09/17/2018  Automated     Sodium 09/17/2018 140     Potassium 09/17/2018 4.1     Chloride 09/17/2018 105     CO2 09/17/2018 26     Glucose 09/17/2018 72     BUN, Bld 09/17/2018 10     Creatinine 09/17/2018 0.9     Calcium 09/17/2018 9.6     Total Protein 09/17/2018 7.9     Albumin 09/17/2018 4.2     Total Bilirubin 09/17/2018 0.8     Alkaline Phosphatase 09/17/2018 80     AST 09/17/2018 28     ALT 09/17/2018 33     Anion Gap 09/17/2018 9     eGFR if African American 09/17/2018 >60.0     eGFR if non African Amer* 09/17/2018 >60.0     Thyroid-Stim IG Quantita* 09/17/2018 <0.10        Assessment:     1. Hyperthyroidism  NM Thyroid Uptake and Scan   2. Multiple thyroid nodules  NM Thyroid Uptake and Scan    patient with recent subclinical hyperthyroidism on labs done February 2019 and patient admits compliance with methimazole.  The etiology of his hyperthyroidism is unclear.  His TSI antibody has been negative but on sure if that is because of long term use of methimazole    Would like to get further evaluation of the hyperthyroidism and nodules with a thyroid uptake and scan and advised him to hold the methimazole 3 days prior to the test and then resume after test is done    I discussed if toxic nodule is found to be a cause of hyperthyroidism then radioactive iodine can be given and went over radioactive iodine precautions would he and his wife but they are reluctant because of small children as they may not be able to follow the radioactive iodine precautions      Plan:   Hyperthyroidism  -     NM Thyroid Uptake and Scan; Future; Expected date: 04/01/2019    Multiple thyroid nodules  -     NM Thyroid Uptake and Scan; Future; Expected date: 04/01/2019          No follow-ups on file.    Labs prior to appointment? not applicable     Disclaimer:  This note may have been partially prepared using voice recognition software and  it may have not been extensively proofed, as such there could be errors within the text  such as sound alike errors.

## 2019-04-15 ENCOUNTER — TELEPHONE (OUTPATIENT)
Dept: ENDOCRINOLOGY | Facility: CLINIC | Age: 34
End: 2019-04-15

## 2019-04-15 NOTE — TELEPHONE ENCOUNTER
----- Message from Vicky Harris LPN sent at 4/12/2019  1:10 PM CDT -----  Contact: Wife- miriam- [phone]       ----- Message -----  From: Daxa Cote  Sent: 4/12/2019  10:06 AM  To: [facility] Staff    Would like to schedule appt for test.  Please call back at [phone].  Thx-AH

## 2019-06-17 RX ORDER — METHIMAZOLE 10 MG/1
TABLET ORAL
Qty: 30 TABLET | Refills: 3 | Status: SHIPPED | OUTPATIENT
Start: 2019-06-17 | End: 2020-03-02

## 2019-08-12 ENCOUNTER — HISTORICAL (OUTPATIENT)
Dept: ADMINISTRATIVE | Facility: HOSPITAL | Age: 34
End: 2019-08-12

## 2019-08-14 LAB — FINAL CULTURE: NORMAL

## 2020-03-02 RX ORDER — METHIMAZOLE 10 MG/1
TABLET ORAL
Qty: 30 TABLET | Refills: 0 | Status: SHIPPED | OUTPATIENT
Start: 2020-03-02 | End: 2020-03-30 | Stop reason: SDUPTHER

## 2020-03-30 ENCOUNTER — TELEPHONE (OUTPATIENT)
Dept: ENDOCRINOLOGY | Facility: CLINIC | Age: 35
End: 2020-03-30

## 2020-03-30 ENCOUNTER — PATIENT MESSAGE (OUTPATIENT)
Dept: ENDOCRINOLOGY | Facility: CLINIC | Age: 35
End: 2020-03-30

## 2020-03-30 RX ORDER — METHIMAZOLE 10 MG/1
10 TABLET ORAL DAILY
Qty: 30 TABLET | Refills: 0 | Status: SHIPPED | OUTPATIENT
Start: 2020-03-30 | End: 2020-08-24

## 2020-03-30 NOTE — TELEPHONE ENCOUNTER
Returning call to pt ----- Message from Trisha Tong sent at 3/30/2020  2:16 PM CDT -----  Contact: WIFE   CALLING BACK CONCERNING VIDEO. PLEASE CALL GIGI @ [phone]. THANKS.

## 2020-03-31 ENCOUNTER — OFFICE VISIT (OUTPATIENT)
Dept: ENDOCRINOLOGY | Facility: CLINIC | Age: 35
End: 2020-03-31
Payer: COMMERCIAL

## 2020-03-31 DIAGNOSIS — R53.83 FATIGUE, UNSPECIFIED TYPE: ICD-10-CM

## 2020-03-31 DIAGNOSIS — R13.10 DYSPHAGIA, UNSPECIFIED TYPE: ICD-10-CM

## 2020-03-31 DIAGNOSIS — E05.90 HYPERTHYROIDISM: Primary | ICD-10-CM

## 2020-03-31 DIAGNOSIS — R61 CHRONIC NIGHT SWEATS: ICD-10-CM

## 2020-03-31 PROCEDURE — 99214 OFFICE O/P EST MOD 30 MIN: CPT | Mod: 95,,, | Performed by: INTERNAL MEDICINE

## 2020-03-31 PROCEDURE — 99214 PR OFFICE/OUTPT VISIT, EST, LEVL IV, 30-39 MIN: ICD-10-PCS | Mod: 95,,, | Performed by: INTERNAL MEDICINE

## 2020-03-31 NOTE — PROGRESS NOTES
Patient ID: Jermaine Boast is a 35 y.o. male.  Patient is here for follow up      The patient location is:  home  The chief complaint leading to consultation is:  hyperthyroidism  Visit type: Virtual visit with synchronous audio and video  Total time spent with patient:  15 min  Each patient to whom he or she provides medical services by telemedicine is:  (1) informed of the relationship between the physician and patient and the respective role of any other health care provider with respect to management of the patient; and (2) notified that he or she may decline to receive medical services by telemedicine and may withdraw from such care at any time.    Notes:  See below    Chief Complaint: Hyperthyroidism      HPI    Consultation was requested by Aaareferral Self       Diagnosed: 2014    Previous radiology tests:  Thyroid ultrasound : Yes - has several nodules- benign biopsy Dr Rigo Henriquez -recently had thyroid ultrasound here in February 2019 and showed bilateral thyroid nodules with largest measuring 1.8 cm, more prominent on the left side, many of the nodules seem isoechoic I directly viewed nodules, no calcifications are irregular borders or hypervascularity  NM uptake and scan: No    Previous thyroid surgery: No    TSI antibody had been checked multiple times and was negative  Thyroid symptoms: he complains of having night sweats, states he has feelings of hot and cold but cannot sleep unless air conditioner is on but has night sweats, denies fevers, he does report he has been losing weight and he has been very tired.  Currently working in [employer], has a new born, lives in [facility], also has some trouble when he swallows his pills, feels like he has to drink more to get his pills down otherwise he feels a little discomfort in his neck area      New PCP is Dr Fallon     Thyroid medications: methimazole 10 mg  However because he could not get refills he started taking a 5 mg dose  of an older pill bottle    Takes medication appropriately: Yes        I have reviewed the past medical, family and social history    Review of Systems   Constitutional: Positive for diaphoresis, fatigue and unexpected weight change. Negative for appetite change and fever.   HENT: Negative for sore throat and trouble swallowing.    Eyes: Negative for visual disturbance.   Respiratory: Negative for shortness of breath and wheezing.    Cardiovascular: Negative for chest pain, palpitations and leg swelling.   Gastrointestinal: Negative for abdominal pain, diarrhea, nausea and vomiting.   Endocrine: Negative for cold intolerance, heat intolerance, polydipsia, polyphagia and polyuria.   Genitourinary: Negative for difficulty urinating and dysuria.   Musculoskeletal: Negative for arthralgias and joint swelling.   Skin: Negative for color change and rash.   Neurological: Negative for dizziness, tremors, numbness and headaches.   Hematological: Negative for adenopathy.   Psychiatric/Behavioral: Negative for confusion, dysphoric mood and sleep disturbance. The patient is not nervous/anxious.        Objective:      Physical Exam   Constitutional: No distress.   Neurological: He is alert.   Skin: He is not diaphoretic.   Psychiatric: He has a normal mood and affect.         Lab Review:   No visits with results within 6 Month(s) from this visit.   Latest known visit with results is:   Lab Visit on 09/17/2018   Component Date Value    TSH 09/17/2018 0.129*    T3, Free 09/17/2018 2.7     Free T4 09/17/2018 1.10     WBC 09/17/2018 9.24     RBC 09/17/2018 4.30*    Hemoglobin 09/17/2018 12.4*    Hematocrit 09/17/2018 41.4     Mean Corpuscular Volume 09/17/2018 96     Mean Corpuscular Hemoglo* 09/17/2018 28.8     Mean Corpuscular Hemoglo* 09/17/2018 30.0*    RDW 09/17/2018 13.3     Platelets 09/17/2018 276     MPV 09/17/2018 12.0     Immature Granulocytes 09/17/2018 0.3     Gran # (ANC) 09/17/2018 5.9     Immature  Grans (Abs) 09/17/2018 0.03     Lymph # 09/17/2018 2.2     Mono # 09/17/2018 1.0     Eos # 09/17/2018 0.1     Baso # 09/17/2018 0.04     nRBC 09/17/2018 0     Gran% 09/17/2018 63.4     Lymph% 09/17/2018 24.2     Mono% 09/17/2018 10.7     Eosinophil% 09/17/2018 1.0     Basophil% 09/17/2018 0.4     Differential Method 09/17/2018 Automated     Sodium 09/17/2018 140     Potassium 09/17/2018 4.1     Chloride 09/17/2018 105     CO2 09/17/2018 26     Glucose 09/17/2018 72     BUN, Bld 09/17/2018 10     Creatinine 09/17/2018 0.9     Calcium 09/17/2018 9.6     Total Protein 09/17/2018 7.9     Albumin 09/17/2018 4.2     Total Bilirubin 09/17/2018 0.8     Alkaline Phosphatase 09/17/2018 80     AST 09/17/2018 28     ALT 09/17/2018 33     Anion Gap 09/17/2018 9     eGFR if African American 09/17/2018 >60.0     eGFR if non African Amer* 09/17/2018 >60.0     Thyroid-Stim IG Quantita* 09/17/2018 <0.10        Assessment:     1. Hyperthyroidism  Hemoglobin A1c    Comprehensive metabolic panel    Lipid panel    Insulin, random    TSH    T4, free    T3, free    Testosterone Panel    Prolactin    Luteinizing hormone    Cortisol    Vitamin D    Vitamin B12   2. Fatigue, unspecified type  Hemoglobin A1c    Comprehensive metabolic panel    Lipid panel    Insulin, random    TSH    T4, free    T3, free    Testosterone Panel    Prolactin    Luteinizing hormone    Cortisol    Vitamin D    Vitamin B12   3. Chronic night sweats  Hemoglobin A1c    Comprehensive metabolic panel    Lipid panel    Insulin, random    TSH    T4, free    T3, free    Testosterone Panel    Prolactin    Luteinizing hormone    Cortisol    Vitamin D    Vitamin B12   4. Dysphagia, unspecified type      patient not feeling well lately with night sweats, weight loss, will check labs below for further evaluation, continue the methimazole 5 mg dose for now, I did refill his 10 mg dose, will try to get his labs done at [facility]  location near where he  lives     may need upper GI workup regarding his problems with swallowing      Plan:   Hyperthyroidism  -     Hemoglobin A1c; Future; Expected date: 03/31/2020  -     Comprehensive metabolic panel; Future; Expected date: 03/31/2020  -     Lipid panel; Future; Expected date: 03/31/2020  -     Insulin, random; Future; Expected date: 03/31/2020  -     TSH; Future; Expected date: 03/31/2020  -     T4, free; Future; Expected date: 03/31/2020  -     T3, free; Future; Expected date: 03/31/2020  -     Testosterone Panel; Future; Expected date: 03/31/2020  -     Prolactin; Future; Expected date: 03/31/2020  -     Luteinizing hormone; Future; Expected date: 03/31/2020  -     Cortisol; Future; Expected date: 03/31/2020  -     Vitamin D; Future; Expected date: 03/31/2020  -     Vitamin B12; Future; Expected date: 03/31/2020    Fatigue, unspecified type  -     Hemoglobin A1c; Future; Expected date: 03/31/2020  -     Comprehensive metabolic panel; Future; Expected date: 03/31/2020  -     Lipid panel; Future; Expected date: 03/31/2020  -     Insulin, random; Future; Expected date: 03/31/2020  -     TSH; Future; Expected date: 03/31/2020  -     T4, free; Future; Expected date: 03/31/2020  -     T3, free; Future; Expected date: 03/31/2020  -     Testosterone Panel; Future; Expected date: 03/31/2020  -     Prolactin; Future; Expected date: 03/31/2020  -     Luteinizing hormone; Future; Expected date: 03/31/2020  -     Cortisol; Future; Expected date: 03/31/2020  -     Vitamin D; Future; Expected date: 03/31/2020  -     Vitamin B12; Future; Expected date: 03/31/2020    Chronic night sweats  -     Hemoglobin A1c; Future; Expected date: 03/31/2020  -     Comprehensive metabolic panel; Future; Expected date: 03/31/2020  -     Lipid panel; Future; Expected date: 03/31/2020  -     Insulin, random; Future; Expected date: 03/31/2020  -     TSH; Future; Expected date: 03/31/2020  -     T4, free; Future; Expected date: 03/31/2020  -     T3,  free; Future; Expected date: 03/31/2020  -     Testosterone Panel; Future; Expected date: 03/31/2020  -     Prolactin; Future; Expected date: 03/31/2020  -     Luteinizing hormone; Future; Expected date: 03/31/2020  -     Cortisol; Future; Expected date: 03/31/2020  -     Vitamin D; Future; Expected date: 03/31/2020  -     Vitamin B12; Future; Expected date: 03/31/2020    Dysphagia, unspecified type          No follow-ups on file.    Labs prior to appointment? not applicable     Disclaimer:  This note may have been partially prepared using voice recognition software and  it may have not been extensively proofed, as such there could be errors within the text such as sound alike errors.

## 2020-03-31 NOTE — Clinical Note
Please schedule fasting morning labs that should be drawn before 10:00 a.m. Preferably at and [facility] affiliated facility that we can schedule via epic, he may be willing to drive to be if that facility does not exist in the [facility], you will need some advanced notice to let his job in [place]  Be aware so that he can take off

## 2020-04-08 ENCOUNTER — TELEPHONE (OUTPATIENT)
Dept: ENDOCRINOLOGY | Facility: CLINIC | Age: 35
End: 2020-04-08

## 2020-06-19 ENCOUNTER — TELEPHONE (OUTPATIENT)
Dept: ENDOCRINOLOGY | Facility: CLINIC | Age: 35
End: 2020-06-19

## 2020-06-19 NOTE — TELEPHONE ENCOUNTER
----- Message from Kim Banks sent at 6/19/2020 10:02 AM CDT -----  Regarding: Patient Wife  Patient needs to schedule a hospital f/u appt for Monday 06/22/2020. Please call Oksana back at [phone]

## 2020-06-19 NOTE — TELEPHONE ENCOUNTER
Called to speak with Pt's wife about setting up appointment with Dr. Raygoza in the office       ----- Message from Kim Banks sent at 6/19/2020 10:02 AM CDT -----  Regarding: Patient Wife  Patient needs to schedule a hospital f/u appt for Monday 06/22/2020. Please call Oksana back at [phone]

## 2022-05-07 NOTE — TELEPHONE ENCOUNTER
----- Message from Bernadette Cole sent at 10/11/2017  8:52 AM CDT -----  Contact: Pt  Please give pt a call at ..[phone] (home) regarding his paper work     Citizen of [place]